# Patient Record
Sex: FEMALE | Race: WHITE | NOT HISPANIC OR LATINO | URBAN - METROPOLITAN AREA
[De-identification: names, ages, dates, MRNs, and addresses within clinical notes are randomized per-mention and may not be internally consistent; named-entity substitution may affect disease eponyms.]

---

## 2024-02-07 ENCOUNTER — OFFICE VISIT (OUTPATIENT)
Dept: OBGYN CLINIC | Facility: CLINIC | Age: 34
End: 2024-02-07
Payer: COMMERCIAL

## 2024-02-07 VITALS
HEART RATE: 65 BPM | BODY MASS INDEX: 20.51 KG/M2 | DIASTOLIC BLOOD PRESSURE: 74 MMHG | WEIGHT: 127.6 LBS | SYSTOLIC BLOOD PRESSURE: 100 MMHG | HEIGHT: 66 IN

## 2024-02-07 DIAGNOSIS — N63.13 MASS OF LOWER OUTER QUADRANT OF RIGHT BREAST: Primary | ICD-10-CM

## 2024-02-07 PROCEDURE — 99203 OFFICE O/P NEW LOW 30 MIN: CPT | Performed by: PHYSICIAN ASSISTANT

## 2024-02-07 NOTE — PROGRESS NOTES
"Patricia Pearson  1990    CC: \"breast mass\" x 1 week    S:  33 y.o. female here with c/o breast mass x 1 week. First noted mass of inferior right breast just prior to menses on sent. In the past week it seems to be less prominent and perhaps decreased in size, but remains present. Has since felt a second mass of the upper outer quadrant but is unsure how long this has been present. No skin changes, nipple changes, discharge, tenderness, or lymphadenopathy/swelling.   Denies family history of breast cancer.       No current outpatient medications on file.  Social History     Socioeconomic History    Marital status: /Civil Union     Spouse name: Not on file    Number of children: Not on file    Years of education: Not on file    Highest education level: Not on file   Occupational History    Not on file   Tobacco Use    Smoking status: Never    Smokeless tobacco: Never   Substance and Sexual Activity    Alcohol use: Never    Drug use: Never    Sexual activity: Yes     Partners: Male     Birth control/protection: None   Other Topics Concern    Not on file   Social History Narrative    Not on file     Social Determinants of Health     Financial Resource Strain: Not on file   Food Insecurity: Not on file   Transportation Needs: Not on file   Physical Activity: Not on file   Stress: Not on file   Social Connections: Not on file   Intimate Partner Violence: Not on file   Housing Stability: Not on file     History reviewed. No pertinent family history.   History reviewed. No pertinent past medical history.       Review of Systems  Constitutional: Negative fever, chills, sweats, recent unexpected change in weight.    Respiratory: Negative cough, shortness of breath, wheezing, or chest wall injury.   Cardiovascular: Negative chest pain or palpitations.   Breast: Positive mass. Negative change in skin coloring, rashes, pain, nipple discharge, or axillary lymph node swelling.     O:  /74 (BP Location: Left arm, " "Patient Position: Sitting, Cuff Size: Standard)   Pulse 65   Ht 5' 5.75\" (1.67 m)   Wt 57.9 kg (127 lb 9.6 oz)   LMP 02/04/2024   BMI 20.75 kg/m²   Constitutional: She appears well and is in no distress  Head: normocephalic, atraumatic.   Breast: Two 1 cm masses of the right breast at 6 and approx 10 oclock. Both are smooth, flat, mobile. NTTP. Breasts are otherwise symmetrical without tenderness, nipple discharge, skin changes or adenopathy.   Respiratory:Normal effort  Neuro:No focal neurological deficits  Pscyh: Normal mood, affect, and behavior     A/P:  Diagnoses and all orders for this visit:    Mass of lower outer quadrant of right breast  -     US breast right limited (diagnostic); Future  -     Mammo diagnostic right w 3d & cad; Future      Reviewed exam findings. Can check imaging. Plan pending results.        "

## 2024-02-15 ENCOUNTER — HOSPITAL ENCOUNTER (EMERGENCY)
Facility: HOSPITAL | Age: 34
Discharge: HOME | End: 2024-02-15
Attending: EMERGENCY MEDICINE
Payer: COMMERCIAL

## 2024-02-15 ENCOUNTER — HOSPITAL ENCOUNTER (OUTPATIENT)
Dept: ULTRASOUND IMAGING | Facility: CLINIC | Age: 34
Discharge: HOME | End: 2024-02-15

## 2024-02-15 ENCOUNTER — APPOINTMENT (EMERGENCY)
Dept: RADIOLOGY | Facility: HOSPITAL | Age: 34
End: 2024-02-15
Payer: COMMERCIAL

## 2024-02-15 ENCOUNTER — HOSPITAL ENCOUNTER (OUTPATIENT)
Dept: MAMMOGRAPHY | Facility: CLINIC | Age: 34
End: 2024-02-15
Payer: COMMERCIAL

## 2024-02-15 VITALS
TEMPERATURE: 97.6 F | DIASTOLIC BLOOD PRESSURE: 71 MMHG | RESPIRATION RATE: 18 BRPM | OXYGEN SATURATION: 100 % | SYSTOLIC BLOOD PRESSURE: 111 MMHG | HEART RATE: 63 BPM

## 2024-02-15 VITALS — SYSTOLIC BLOOD PRESSURE: 100 MMHG | HEART RATE: 63 BPM | DIASTOLIC BLOOD PRESSURE: 68 MMHG

## 2024-02-15 DIAGNOSIS — N63.13 MASS OF LOWER OUTER QUADRANT OF RIGHT BREAST: ICD-10-CM

## 2024-02-15 DIAGNOSIS — R55 VASOVAGAL SYNCOPE: Primary | ICD-10-CM

## 2024-02-15 DIAGNOSIS — N63.13 LUMP IN LOWER OUTER QUADRANT OF RIGHT BREAST: ICD-10-CM

## 2024-02-15 LAB
ALBUMIN SERPL BCP-MCNC: 4.2 G/DL (ref 3.5–5)
ALP SERPL-CCNC: 38 U/L (ref 34–104)
ALT SERPL W P-5'-P-CCNC: 20 U/L (ref 7–52)
ANION GAP SERPL CALCULATED.3IONS-SCNC: 7 MMOL/L
AST SERPL W P-5'-P-CCNC: 21 U/L (ref 13–39)
BASOPHILS # BLD AUTO: 0.03 THOUSANDS/ÂΜL (ref 0–0.1)
BASOPHILS NFR BLD AUTO: 0 % (ref 0–1)
BILIRUB SERPL-MCNC: 0.81 MG/DL (ref 0.2–1)
BUN SERPL-MCNC: 17 MG/DL (ref 5–25)
CALCIUM SERPL-MCNC: 9.4 MG/DL (ref 8.4–10.2)
CARDIAC TROPONIN I PNL SERPL HS: <2 NG/L
CHLORIDE SERPL-SCNC: 104 MMOL/L (ref 96–108)
CO2 SERPL-SCNC: 26 MMOL/L (ref 21–32)
CREAT SERPL-MCNC: 0.75 MG/DL (ref 0.6–1.3)
EOSINOPHIL # BLD AUTO: 0.07 THOUSAND/ÂΜL (ref 0–0.61)
EOSINOPHIL NFR BLD AUTO: 1 % (ref 0–6)
ERYTHROCYTE [DISTWIDTH] IN BLOOD BY AUTOMATED COUNT: 13 % (ref 11.6–15.1)
GFR SERPL CREATININE-BSD FRML MDRD: 103 ML/MIN/1.73SQ M
GLUCOSE SERPL-MCNC: 110 MG/DL (ref 65–140)
GLUCOSE SERPL-MCNC: 111 MG/DL (ref 65–140)
HCG SERPL QL: NEGATIVE
HCT VFR BLD AUTO: 39.2 % (ref 34.8–46.1)
HGB BLD-MCNC: 13.2 G/DL (ref 11.5–15.4)
IMM GRANULOCYTES # BLD AUTO: 0.03 THOUSAND/UL (ref 0–0.2)
IMM GRANULOCYTES NFR BLD AUTO: 0 % (ref 0–2)
LYMPHOCYTES # BLD AUTO: 1.36 THOUSANDS/ÂΜL (ref 0.6–4.47)
LYMPHOCYTES NFR BLD AUTO: 17 % (ref 14–44)
MCH RBC QN AUTO: 29.9 PG (ref 26.8–34.3)
MCHC RBC AUTO-ENTMCNC: 33.7 G/DL (ref 31.4–37.4)
MCV RBC AUTO: 89 FL (ref 82–98)
MONOCYTES # BLD AUTO: 0.63 THOUSAND/ÂΜL (ref 0.17–1.22)
MONOCYTES NFR BLD AUTO: 8 % (ref 4–12)
NEUTROPHILS # BLD AUTO: 6.01 THOUSANDS/ÂΜL (ref 1.85–7.62)
NEUTS SEG NFR BLD AUTO: 74 % (ref 43–75)
NRBC BLD AUTO-RTO: 0 /100 WBCS
PLATELET # BLD AUTO: 196 THOUSANDS/UL (ref 149–390)
PMV BLD AUTO: 9.8 FL (ref 8.9–12.7)
POTASSIUM SERPL-SCNC: 3.8 MMOL/L (ref 3.5–5.3)
PROT SERPL-MCNC: 6.6 G/DL (ref 6.4–8.4)
RBC # BLD AUTO: 4.41 MILLION/UL (ref 3.81–5.12)
SODIUM SERPL-SCNC: 137 MMOL/L (ref 135–147)
WBC # BLD AUTO: 8.13 THOUSAND/UL (ref 4.31–10.16)

## 2024-02-15 PROCEDURE — 77066 DX MAMMO INCL CAD BI: CPT

## 2024-02-15 PROCEDURE — G0279 TOMOSYNTHESIS, MAMMO: HCPCS

## 2024-02-15 PROCEDURE — 99284 EMERGENCY DEPT VISIT MOD MDM: CPT

## 2024-02-15 PROCEDURE — 80053 COMPREHEN METABOLIC PANEL: CPT | Performed by: PHYSICIAN ASSISTANT

## 2024-02-15 PROCEDURE — 71046 X-RAY EXAM CHEST 2 VIEWS: CPT

## 2024-02-15 PROCEDURE — 84484 ASSAY OF TROPONIN QUANT: CPT | Performed by: PHYSICIAN ASSISTANT

## 2024-02-15 PROCEDURE — 99285 EMERGENCY DEPT VISIT HI MDM: CPT | Performed by: PHYSICIAN ASSISTANT

## 2024-02-15 PROCEDURE — 85025 COMPLETE CBC W/AUTO DIFF WBC: CPT | Performed by: PHYSICIAN ASSISTANT

## 2024-02-15 PROCEDURE — 93005 ELECTROCARDIOGRAM TRACING: CPT

## 2024-02-15 PROCEDURE — 84703 CHORIONIC GONADOTROPIN ASSAY: CPT | Performed by: PHYSICIAN ASSISTANT

## 2024-02-15 PROCEDURE — 36415 COLL VENOUS BLD VENIPUNCTURE: CPT | Performed by: PHYSICIAN ASSISTANT

## 2024-02-15 PROCEDURE — 82948 REAGENT STRIP/BLOOD GLUCOSE: CPT

## 2024-02-15 RX ORDER — ONDANSETRON 2 MG/ML
4 INJECTION INTRAMUSCULAR; INTRAVENOUS ONCE
Status: DISCONTINUED | OUTPATIENT
Start: 2024-02-15 | End: 2024-02-15 | Stop reason: HOSPADM

## 2024-02-15 NOTE — ED PROVIDER NOTES
"History  Chief Complaint   Patient presents with    Syncope     Pt coming from [facility], \"I passed out during my mammogram,\" pt was in a seated position and denies head strike, reports feeling light headed at the time and states this has happened before during bloodwork and during a gyno exam, pt denies complaints at this time.     Patricia Pearson is a 33-year-old female presenting to the emergency department after a syncopal episode during an outpatient mammogram this morning. [facility] reports that the patient was placed in a reclined position and juice and crackers however with repeated attempts to have the patient stand upright she became symptomatic.  Patient reports nausea which she is attributing to not having eaten much today.  She feels somewhat dizzy and generally weak.  She denies any chest pain, palpitations, shortness of breath, headache or vision changes.  She does admit to prior episodes of vasovagal syncope with pelvic exams and lab work.  She denies history of congenital heart disease or family history of sudden cardiac death.  Patient offers no other complaints or concerns at this time.        None       No past medical history on file.    No past surgical history on file.    No family history on file.  I have reviewed and agree with the history as documented.    E-Cigarette/Vaping     E-Cigarette/Vaping Substances     Social History     Tobacco Use    Smoking status: Never    Smokeless tobacco: Never   Substance Use Topics    Alcohol use: Never    Drug use: Never       Review of Systems   Constitutional:  Negative for chills, fatigue and fever.   Eyes:  Negative for visual disturbance.   Respiratory:  Negative for shortness of breath.    Cardiovascular:  Negative for chest pain and palpitations.   Gastrointestinal:  Positive for nausea. Negative for abdominal pain and vomiting.   Skin:  Negative for pallor and rash.   Neurological:  Positive for syncope. Negative for weakness, numbness " and headaches.   All other systems reviewed and are negative.      Physical Exam  Physical Exam  Vitals and nursing note reviewed.   Constitutional:       General: She is not in acute distress.     Appearance: Normal appearance. She is well-developed. She is not ill-appearing, toxic-appearing or diaphoretic.   HENT:      Head: Normocephalic and atraumatic.      Right Ear: External ear normal.      Left Ear: External ear normal.      Mouth/Throat:      Mouth: Mucous membranes are moist.   Eyes:      Extraocular Movements: Extraocular movements intact.      Conjunctiva/sclera: Conjunctivae normal.      Pupils: Pupils are equal, round, and reactive to light.   Cardiovascular:      Rate and Rhythm: Normal rate and regular rhythm.      Pulses: Normal pulses.   Pulmonary:      [place]: Pulmonary effort is normal. No respiratory distress.      Breath sounds: Normal breath sounds. No wheezing, rhonchi or rales.   Chest:      Chest wall: No tenderness.   Abdominal:      General: There is no distension.      Palpations: Abdomen is soft.      Tenderness: There is no abdominal tenderness.   Musculoskeletal:         General: Normal range of motion.      Cervical back: Normal range of motion and neck supple.   Skin:     General: Skin is warm and dry.      Capillary Refill: Capillary refill takes less than 2 seconds.   Neurological:      Mental Status: She is alert.      Motor: Motor function is intact. No weakness.      Gait: Gait is intact.   Psychiatric:         Mood and Affect: Mood normal.         Vital Signs  ED Triage Vitals   Temperature Pulse Respirations Blood Pressure SpO2   02/15/24 1157 02/15/24 1157 02/15/24 1157 02/15/24 1200 02/15/24 1157   97.6 °F (36.4 °C) 63 18 111/71 100 %      Temp Source Heart Rate Source Patient Position - Orthostatic VS BP Location FiO2 (%)   02/15/24 1157 02/15/24 1157 -- -- --   Oral Monitor         Pain Score       02/15/24 1157       No Pain           Vitals:    02/15/24 1157 02/15/24  1200   BP:  111/71   Pulse: 63          Visual Acuity      ED Medications  Medications - No data to display      Diagnostic Studies  Results Reviewed       Procedure Component Value Units Date/Time    hCG, qualitative pregnancy [[id number]]  (Normal) Collected: 02/15/24 1247    Lab Status: Final result Specimen: Blood from Arm, Right Updated: 02/15/24 1459     Preg, Serum Negative    HS Troponin 0hr (reflex protocol) [[id number]]  (Normal) Collected: 02/15/24 1247    Lab Status: Final result Specimen: Blood from Arm, Right Updated: 02/15/24 1316     hs TnI 0hr <2 ng/L     Comprehensive metabolic panel [[id number]] Collected: 02/15/24 1247    Lab Status: Final result Specimen: Blood from Arm, Right Updated: 02/15/24 1309     Sodium 137 mmol/L      Potassium 3.8 mmol/L      Chloride 104 mmol/L      CO2 26 mmol/L      ANION GAP 7 mmol/L      BUN 17 mg/dL      Creatinine 0.75 mg/dL      Glucose 110 mg/dL      Calcium 9.4 mg/dL      AST 21 U/L      ALT 20 U/L      Alkaline Phosphatase 38 U/L      Total Protein 6.6 g/dL      Albumin 4.2 g/dL      Total Bilirubin 0.81 mg/dL      eGFR 103 ml/min/1.73sq m     Narrative:      National Kidney Disease Foundation guidelines for Chronic Kidney Disease (CKD):     Stage 1 with normal or high GFR (GFR > 90 mL/min/1.73 square meters)    Stage 2 Mild CKD (GFR = 60-89 mL/min/1.73 square meters)    Stage 3A Moderate CKD (GFR = 45-59 mL/min/1.73 square meters)    Stage 3B Moderate CKD (GFR = 30-44 mL/min/1.73 square meters)    Stage 4 Severe CKD (GFR = 15-29 mL/min/1.73 square meters)    Stage 5 End Stage CKD (GFR <15 mL/min/1.73 square meters)  Note: GFR calculation is accurate only with a steady state creatinine    CBC and differential [[id number]] Collected: 02/15/24 1247    Lab Status: Final result Specimen: Blood from Arm, Right Updated: 02/15/24 1255     WBC 8.13 Thousand/uL      RBC 4.41 Million/uL      Hemoglobin 13.2 g/dL      Hematocrit 39.2 %      MCV 89 fL      MCH 29.9 pg       MCHC 33.7 g/dL      RDW 13.0 %      MPV 9.8 fL      Platelets 196 Thousands/uL      nRBC 0 /100 WBCs      Neutrophils Relative 74 %      Immat GRANS % 0 %      Lymphocytes Relative 17 %      Monocytes Relative 8 %      Eosinophils Relative 1 %      Basophils Relative 0 %      Neutrophils Absolute 6.01 Thousands/µL      Immature Grans Absolute 0.03 Thousand/uL      Lymphocytes Absolute 1.36 Thousands/µL      Monocytes Absolute 0.63 Thousand/µL      Eosinophils Absolute 0.07 Thousand/µL      Basophils Absolute 0.03 Thousands/µL     Fingerstick Glucose (POCT) [[id number]]  (Normal) Collected: 02/15/24 1204    Lab Status: Final result Updated: 02/15/24 1205     POC Glucose 111 mg/dl                    XR chest pa & lateral   Final Result by Pedro Hernandez MD (02/16 0908)      No acute cardiopulmonary disease.            Workstation performed: [id number]                    Procedures  ECG 12 Lead Documentation Only    Date/Time: 2/15/2024 12:23 PM    Performed by: Elise Herrera PA-C  Authorized by: Elise Herrera PA-C    Indications / Diagnosis:  Near syncope  ECG reviewed by me, the ED Provider: yes    Patient location:  ED  Rate:     ECG rate:  58  Rhythm:     Rhythm: sinus bradycardia    Ectopy:     Ectopy: none    QRS:     QRS axis:  Normal    QRS intervals:  Normal  Conduction:     Conduction: normal    Comments:      No ischemic ST/T wave change           ED Course                               SBIRT 20yo+      Flowsheet Row Most Recent Value   Initial Alcohol Screen: US AUDIT-C     1. How often do you have a drink containing alcohol? 0 Filed at: 02/15/2024 1252   2. How many drinks containing alcohol do you have on a typical day you are drinking?  0 Filed at: 02/15/2024 1252   3b. FEMALE Any Age, or MALE 65+: How often do you have 4 or more drinks on one occassion? 0 Filed at: 02/15/2024 1252   Audit-C Score 0 Filed at: 02/15/2024 1252   HEATH: How many times in the past year have you...    Used an  illegal drug or used a prescription medication for non-medical reasons? Never Filed at: 02/15/2024 1252                      Medical Decision Making  This is a 32yo female arriving to the emergency department after a syncopal event while having an outpatient mammogram performed today.  She denies any precipitating or current chest pain, shortness of breath, diaphoresis, headache, abdominal pain or other symptoms. She reports that she has had syncopal episodes with lab work and pelvic exams in the past.    Differential diagnosis includes but is not limited to: suspect vasovagal syncope; will obtain ECG/troponin to assess for arrhythmia, electrolyte derangement, anemia    Initial ED plan: ecg, labs, cxr, ivf/antiemetics and reassessment    Final ED Assessment: Vital signs reviewed on ED presentation, examination as above. All labs and imaging independently reviewed with imaging interpreted by the Radiologist.  ECG without ischemic change, troponin within normal limits.  Remainder of lab work is without significant findings.  Chest x-ray reports no acute cardiopulmonary disease.  Orthostatic vital signs are within normal limits.  Patient has demonstrated ability to tolerate oral intake in the ED without issue.  Her symptoms had resolved during observation.  We reviewed all test results at bedside and plan for discharge with recommendation for rest and continued supportive measures.  Patient declined prescription for Zofran to take as needed for nausea.  Recommended outpatient PCP follow-up as needed, and parameters for ED return discussed at length.  Patient verbalized understanding of plan as above which she is comfortable and agreeable with.  She was discharged in stable condition, ambulating independently from the emergency department today without issue.    Amount and/or Complexity of Data Reviewed  Labs: ordered.  Radiology: ordered.  ECG/medicine tests: ordered.    Risk  OTC drugs.             Disposition  Final  diagnoses:   Vasovagal syncope     Time reflects when diagnosis was documented in both MDM as applicable and the Disposition within this note       Time User Action Codes Description Comment    2/15/2024  1:55 PM Elise Herrera Add [R55] Vasovagal syncope           ED Disposition       ED Disposition   Discharge    Condition   Stable    Date/Time   u Feb 15, 2024 9495    Comment   Patricia Pearson discharge to home/self care.                   Follow-up Information       Follow up With Specialties Details Why Contact Info Additional Information    Your PCP   As needed      [facility] Emergency Department Emergency Medicine  If symptoms worsen [address] [facility] [id number] [phone] [facility] Emergency Department, [address], [place], [address]            There are no discharge medications for this patient.      No discharge procedures on file.    PDMP Review       None            ED Provider  Electronically Signed by             Elise Herrera PA-C  02/16/24 0951

## 2024-02-15 NOTE — PROGRESS NOTES
Called into room by staff, pt found lying on floor of mammogram room with staff members around her, per SUZAN Tellez, mammogram tech that was with patient during procedure, pt was in mammogram machine and stated that she did not feel well, mammogram tech grabbed patient as she lost consciousness and lowered her to the floor, she then pushed button for help, pt was lying supine on floor, pale, diaphoretic, semi-conscious, pt eyes open but not responding to verbal questions, after approx 30 seconds pt able to tell staff her name and where she was, pt giving correct answers, according to staff, when they laid patient on the floor, pt body was rigid and she had thick secretion at mouth, almost seizure-like but pt did not appear to have a post-ictal period, pt able to answer questions quickly when she came around, pt VS assessed many times (see flowsheet), pt c/o nausea but stated she wanted to sit up and assisted to a chair, pt then stated she wanted to lie down for a little so she was rolled in the chair down to our holding area and placed in a reclining chair, pt made comfortable, still c/o nausea, offered crackers and ginger ale but pt declined, ice placed behind pt neck for comfort as she was c/o being hot, after pt spent time lying in chair pt decided she would like to try to complete mammogram imaging, pt walked back to mammogram room for another attempt, pt placed in chair for additional imaging, pt able to complete 1 additional image, when mammogram tech went to change breasts, pt state she didn't feel well again, pt became pale, diaphoretic and stated she was nauseated, pt again rolled back to holding area in chair, placed back in reclining chair and medical emergency called as now pt BP 95/63 with HR of 58, ER staff arrived, report given and care assumed by ER staff

## 2024-02-15 NOTE — DISCHARGE INSTRUCTIONS
Continue hydration and oral intake as tolerated.  Slow position changes.  Return to the ED with any new or worsening symptoms as discussed.

## 2024-02-16 LAB
ATRIAL RATE: 58 BPM
P AXIS: 46 DEGREES
PR INTERVAL: 142 MS
QRS AXIS: 75 DEGREES
QRSD INTERVAL: 84 MS
QT INTERVAL: 430 MS
QTC INTERVAL: 422 MS
T WAVE AXIS: 72 DEGREES
VENTRICULAR RATE: 58 BPM

## 2024-02-16 PROCEDURE — 93010 ELECTROCARDIOGRAM REPORT: CPT | Performed by: INTERNAL MEDICINE

## 2024-02-20 ENCOUNTER — HOSPITAL ENCOUNTER (OUTPATIENT)
Dept: ULTRASOUND IMAGING | Facility: CLINIC | Age: 34
Discharge: HOME | End: 2024-02-20
Payer: COMMERCIAL

## 2024-02-20 PROCEDURE — 76642 ULTRASOUND BREAST LIMITED: CPT

## 2024-04-12 ENCOUNTER — ANNUAL EXAM (OUTPATIENT)
Dept: OBGYN CLINIC | Facility: MEDICAL CENTER | Age: 34
End: 2024-04-12
Payer: COMMERCIAL

## 2024-04-12 ENCOUNTER — LAB (OUTPATIENT)
Dept: LAB | Facility: MEDICAL CENTER | Age: 34
End: 2024-04-12
Payer: COMMERCIAL

## 2024-04-12 ENCOUNTER — TELEPHONE (OUTPATIENT)
Dept: OBGYN CLINIC | Facility: MEDICAL CENTER | Age: 34
End: 2024-04-12

## 2024-04-12 VITALS
WEIGHT: 127.8 LBS | DIASTOLIC BLOOD PRESSURE: 68 MMHG | SYSTOLIC BLOOD PRESSURE: 108 MMHG | HEIGHT: 66 IN | BODY MASS INDEX: 20.54 KG/M2

## 2024-04-12 DIAGNOSIS — Z01.419 ENCOUNTER FOR GYNECOLOGICAL EXAMINATION (GENERAL) (ROUTINE) WITHOUT ABNORMAL FINDINGS: Primary | ICD-10-CM

## 2024-04-12 DIAGNOSIS — N92.6 IRREGULAR MENSTRUATION: ICD-10-CM

## 2024-04-12 DIAGNOSIS — N63.10 MASS OF RIGHT BREAST, UNSPECIFIED QUADRANT: ICD-10-CM

## 2024-04-12 DIAGNOSIS — N76.0 BACTERIAL VAGINOSIS: ICD-10-CM

## 2024-04-12 DIAGNOSIS — Z11.51 SCREENING FOR HPV (HUMAN PAPILLOMAVIRUS): ICD-10-CM

## 2024-04-12 DIAGNOSIS — B96.89 BACTERIAL VAGINOSIS: ICD-10-CM

## 2024-04-12 PROBLEM — N63.20 LUMP OF LEFT BREAST: Status: ACTIVE | Noted: 2024-04-12

## 2024-04-12 PROBLEM — N89.8 VAGINAL IRRITATION: Status: RESOLVED | Noted: 2024-04-12 | Resolved: 2024-04-12

## 2024-04-12 PROBLEM — N89.8 VAGINAL IRRITATION: Status: ACTIVE | Noted: 2024-04-12

## 2024-04-12 LAB
BV WHIFF TEST VAG QL: ABNORMAL
CLUE CELLS SPEC QL WET PREP: ABNORMAL
SL AMB POCT WET MOUNT: ABNORMAL
T VAGINALIS VAG QL WET PREP: ABNORMAL
TSH SERPL DL<=0.05 MIU/L-ACNC: 1.81 UIU/ML (ref 0.45–4.5)
YEAST VAG QL WET PREP: ABNORMAL

## 2024-04-12 PROCEDURE — 36415 COLL VENOUS BLD VENIPUNCTURE: CPT

## 2024-04-12 PROCEDURE — 87210 SMEAR WET MOUNT SALINE/INK: CPT | Performed by: STUDENT IN AN ORGANIZED HEALTH CARE EDUCATION/TRAINING PROGRAM

## 2024-04-12 PROCEDURE — S0612 ANNUAL GYNECOLOGICAL EXAMINA: HCPCS | Performed by: STUDENT IN AN ORGANIZED HEALTH CARE EDUCATION/TRAINING PROGRAM

## 2024-04-12 PROCEDURE — G0145 SCR C/V CYTO,THINLAYER,RESCR: HCPCS | Performed by: STUDENT IN AN ORGANIZED HEALTH CARE EDUCATION/TRAINING PROGRAM

## 2024-04-12 PROCEDURE — 84443 ASSAY THYROID STIM HORMONE: CPT

## 2024-04-12 RX ORDER — METRONIDAZOLE 500 MG/1
500 TABLET ORAL EVERY 12 HOURS SCHEDULED
Qty: 14 TABLET | Refills: 0 | Status: SHIPPED | OUTPATIENT
Start: 2024-04-12 | End: 2024-04-19

## 2024-04-12 NOTE — ASSESSMENT & PLAN NOTE
-pap: collected today, unsure of last pap, denies abnormal   -LMP: 3/31/24  -sexually active, denies dyspareunia, declines contraception  -STD testing: denies  -smoking: has one cigarette every 1-2 days, not interested in quitting    -drinks alcohol socially  -recommend 30 min of exercise daily   -denies family history of ovarian, breast, colon cancer  -return in one year or earlier if needed

## 2024-04-12 NOTE — ASSESSMENT & PLAN NOTE
-wet mount positive for BV   -flagyl ordered to pharmacy, instructions on use provided, side effects discussed

## 2024-04-12 NOTE — TELEPHONE ENCOUNTER
Patient called refill line stating that she was suppose have an antibiotic sent to her pharmacy but they did not receive it. When reviewing notes it looks herbert youngyl was suppose to be sent to the [facility] in [place]. Please review and send to pharmacy. Also please call patient when medication has been sent.

## 2024-04-12 NOTE — ASSESSMENT & PLAN NOTE
-has spotting the week before her menses since stopping breast feeding in Feb 2023   -Pelvic US and TSH ordered, can consider EMB if indicated

## 2024-04-12 NOTE — PROGRESS NOTES
Assessment/Plan:    Encounter for gynecological examination (general) (routine) without abnormal findings  -pap: collected today, unsure of last pap, denies abnormal   -LMP: 3/31/24  -sexually active, denies dyspareunia, declines contraception  -STD testing: denies  -smoking: has one cigarette every 1-2 days, not interested in quitting    -drinks alcohol socially  -recommend 30 min of exercise daily   -denies family history of ovarian, breast, colon cancer  -return in one year or earlier if needed     Lump of right breast  -mammogram: 2024, right breast BIRADs 2, left breast BIRADs 3 with repeat in 6 months scheduled 24    Irregular menstruation  -has spotting the week before her menses since stopping breast feeding in 2023   -Pelvic US and TSH ordered, can consider EMB if indicated    Bacterial vaginosis  -wet mount positive for BV   -flagyl ordered to pharmacy, instructions on use provided, side effects discussed        Diagnoses and all orders for this visit:    Encounter for gynecological examination (general) (routine) without abnormal findings  -     Liquid-based pap, screening    Screening for HPV (human papillomavirus)  -     Liquid-based pap, screening    Mass of right breast, unspecified quadrant    Irregular menstruation  -     US pelvis complete w transvaginal; Future  -     TSH, 3rd generation with Free T4 reflex; Future    Bacterial vaginosis  -     POCT wet mount          Subjective:      Patient ID: Patricia Pearson is a 33 y.o. female.    32yo  LMP 3/31/24 presents for annual exam. Reports spotting the week before her menses. Also reports vaginal irritation and itching for the last two days.        The following portions of the patient's history were reviewed and updated as appropriate: allergies, current medications, past family history, past medical history, past social history, past surgical history, and problem list.    Review of Systems   Constitutional:  Negative for appetite  "change, chills, fatigue and fever.   Respiratory:  Negative for cough, chest tightness, shortness of breath and wheezing.    Cardiovascular:  Negative for chest pain, palpitations and leg swelling.   Gastrointestinal:  Negative for abdominal distention, abdominal pain, constipation, diarrhea, nausea and vomiting.   Endocrine: Negative for cold intolerance, heat intolerance and polyuria.   Genitourinary:  Negative for difficulty urinating, dyspareunia, dysuria, genital sores, menstrual problem, vaginal bleeding, vaginal discharge and vaginal pain.   Neurological:  Negative for dizziness, weakness, light-headedness and headaches.         Objective:      /68 (BP Location: Left arm, Patient Position: Sitting, Cuff Size: Adult)   Ht 5' 5.75\" (1.67 m)   Wt 58 kg (127 lb 12.8 oz)   LMP 03/31/2024 (Exact Date)   BMI 20.78 kg/m²          Physical Exam  Constitutional:       General: She is not in acute distress.     Appearance: Normal appearance. She is normal weight.   Cardiovascular:      Rate and Rhythm: Normal rate.   Pulmonary:      [place]: Pulmonary effort is normal. No respiratory distress.   Chest:      Chest wall: No mass or tenderness.   Breasts:     Breasts are symmetrical.      Right: No swelling, bleeding, inverted nipple, mass, nipple discharge, skin change or tenderness.      Left: Mass present. No swelling, bleeding, inverted nipple, nipple discharge, skin change or tenderness.      Comments: right breast with 1cm mass at 6 oclock, 1 cm mass at 10 oclock, both smooth and mobile, nontender  Abdominal:      General: There is no distension.      Palpations: There is no mass.      Tenderness: There is no abdominal tenderness. There is no guarding or rebound.   Genitourinary:     General: Normal vulva.      Exam position: Lithotomy position.      Pubic Area: No rash.       Labia:         Right: No rash, tenderness, lesion or injury.         Left: No rash, tenderness, lesion or injury.       Urethra: No " prolapse, urethral pain, urethral swelling or urethral lesion.      Vagina: No signs of injury. No vaginal discharge, tenderness, bleeding, lesions or prolapsed vaginal walls.      Cervix: No cervical motion tenderness, discharge, friability, lesion or erythema.      Uterus: Not deviated, not enlarged, not fixed, not tender and no uterine prolapse.       Adnexa:         Right: No mass, tenderness or fullness.          Left: No mass, tenderness or fullness.     Musculoskeletal:      Right lower leg: No edema.      Left lower leg: No edema.   Lymphadenopathy:      Upper Body:      Right upper body: No supraclavicular, axillary or pectoral adenopathy.      Left upper body: No supraclavicular, axillary or pectoral adenopathy.   Neurological:      Mental Status: She is alert and oriented to person, place, and time.   Psychiatric:         Mood and Affect: Mood normal.

## 2024-04-12 NOTE — ASSESSMENT & PLAN NOTE
-mammogram: 2/2024, right breast BIRADs 2, left breast BIRADs 3 with repeat in 6 months scheduled 9/23/24

## 2024-04-15 LAB
HPV HR 12 DNA CVX QL NAA+PROBE: NEGATIVE
HPV16 DNA CVX QL NAA+PROBE: NEGATIVE
HPV18 DNA CVX QL NAA+PROBE: POSITIVE

## 2024-04-16 ENCOUNTER — TELEPHONE (OUTPATIENT)
Age: 34
End: 2024-04-16

## 2024-04-16 NOTE — TELEPHONE ENCOUNTER
Discussed result with patient over the phone, will await cytology results prior to further planning.

## 2024-04-16 NOTE — TELEPHONE ENCOUNTER
Patient is calling in to review pap results. HPV + 18, PAP in process. She is aware that provider has not yet reviewed her results

## 2024-04-20 LAB
LAB AP GYN PRIMARY INTERPRETATION: NORMAL
Lab: NORMAL

## 2024-04-24 ENCOUNTER — HOSPITAL ENCOUNTER (OUTPATIENT)
Dept: ULTRASOUND IMAGING | Facility: HOSPITAL | Age: 34
Discharge: HOME | End: 2024-04-24
Attending: STUDENT IN AN ORGANIZED HEALTH CARE EDUCATION/TRAINING PROGRAM
Payer: COMMERCIAL

## 2024-04-24 DIAGNOSIS — N92.6 IRREGULAR MENSTRUATION: ICD-10-CM

## 2024-04-24 PROCEDURE — 76830 TRANSVAGINAL US NON-OB: CPT

## 2024-04-24 PROCEDURE — 76856 US EXAM PELVIC COMPLETE: CPT

## 2024-04-30 DIAGNOSIS — N63.13 MASS OF LOWER OUTER QUADRANT OF RIGHT BREAST: Primary | ICD-10-CM

## 2024-04-30 DIAGNOSIS — B96.89 BACTERIAL VAGINOSIS: ICD-10-CM

## 2024-04-30 DIAGNOSIS — N76.0 BACTERIAL VAGINOSIS: ICD-10-CM

## 2024-04-30 RX ORDER — METRONIDAZOLE 500 MG/1
500 TABLET ORAL EVERY 12 HOURS SCHEDULED
Qty: 14 TABLET | Refills: 0 | Status: SHIPPED | OUTPATIENT
Start: 2024-04-30 | End: 2024-05-07

## 2024-05-12 PROBLEM — Z01.419 ENCOUNTER FOR GYNECOLOGICAL EXAMINATION (GENERAL) (ROUTINE) WITHOUT ABNORMAL FINDINGS: Status: RESOLVED | Noted: 2024-04-12 | Resolved: 2024-05-12

## 2024-05-14 DIAGNOSIS — N63.13 MASS OF LOWER OUTER QUADRANT OF RIGHT BREAST: Primary | ICD-10-CM

## 2024-05-15 ENCOUNTER — APPOINTMENT (OUTPATIENT)
Dept: LAB | Facility: CLINIC | Age: 34
End: 2024-05-15
Payer: COMMERCIAL

## 2024-05-15 DIAGNOSIS — N63.13 MASS OF LOWER OUTER QUADRANT OF RIGHT BREAST: ICD-10-CM

## 2024-05-15 LAB — PROLACTIN SERPL-MCNC: 10.47 NG/ML (ref 3.34–26.72)

## 2024-05-15 PROCEDURE — 36415 COLL VENOUS BLD VENIPUNCTURE: CPT

## 2024-05-15 PROCEDURE — 84146 ASSAY OF PROLACTIN: CPT

## 2024-06-20 ENCOUNTER — PROCEDURE VISIT (OUTPATIENT)
Age: 34
End: 2024-06-20
Payer: COMMERCIAL

## 2024-06-20 VITALS
DIASTOLIC BLOOD PRESSURE: 70 MMHG | BODY MASS INDEX: 19.8 KG/M2 | SYSTOLIC BLOOD PRESSURE: 104 MMHG | WEIGHT: 123.2 LBS | HEIGHT: 66 IN

## 2024-06-20 DIAGNOSIS — Z32.02 NEGATIVE PREGNANCY TEST: ICD-10-CM

## 2024-06-20 DIAGNOSIS — R87.810 HUMAN PAPILLOMAVIRUS (HPV) TYPE 18 DNA DETECTED IN CERVICAL SPECIMEN: Primary | ICD-10-CM

## 2024-06-20 LAB — SL AMB POCT URINE HCG: NORMAL

## 2024-06-20 PROCEDURE — 88344 IMHCHEM/IMCYTCHM EA MLT ANTB: CPT | Performed by: STUDENT IN AN ORGANIZED HEALTH CARE EDUCATION/TRAINING PROGRAM

## 2024-06-20 PROCEDURE — 57454 BX/CURETT OF CERVIX W/SCOPE: CPT | Performed by: OBSTETRICS & GYNECOLOGY

## 2024-06-20 PROCEDURE — 81025 URINE PREGNANCY TEST: CPT | Performed by: OBSTETRICS & GYNECOLOGY

## 2024-06-20 PROCEDURE — 88305 TISSUE EXAM BY PATHOLOGIST: CPT | Performed by: STUDENT IN AN ORGANIZED HEALTH CARE EDUCATION/TRAINING PROGRAM

## 2024-06-20 NOTE — PROGRESS NOTES
Colposcopy     Date/Time  6/20/2024 11:00 AM     Universal Protocol   Consent: Verbal consent obtained.  Risks and benefits: risks, benefits and alternatives were discussed  Consent given by: patient  Patient understanding: patient states understanding of the procedure being performed  Patient identity confirmed: verbally with patient     Performed by  Anastasiia Richardson DO   Authorized by  Anastasiia Richardson DO     Pre-procedure details      Prepped with: acetic acid     Indication    Indications: HPV 18.   Procedure Details   Procedure: Colposcopy w/ cervical biopsy and ECC      Dysart speculum was placed in the vagina: yes      Under colposcopic examination the transition zone was seen in entirety: yes      Endocervix was curetted using a Kevorkian curette: yes      Cervical biopsy performed with a cervical biopsy punch: yes      Monsel's solution was applied: yes      Biopsy(s): yes      Location:  12 o clock and 7 o clock    Specimen to pathology: yes     Post-procedure      Findings: Mosaicism and White epithelium      Impression: Low grade cervical dysplasia      Patient tolerance of procedure:  Tolerated well, no immediate complications

## 2024-06-26 PROCEDURE — 88344 IMHCHEM/IMCYTCHM EA MLT ANTB: CPT | Performed by: STUDENT IN AN ORGANIZED HEALTH CARE EDUCATION/TRAINING PROGRAM

## 2024-06-26 PROCEDURE — 88305 TISSUE EXAM BY PATHOLOGIST: CPT | Performed by: STUDENT IN AN ORGANIZED HEALTH CARE EDUCATION/TRAINING PROGRAM

## 2024-09-24 ENCOUNTER — HOSPITAL ENCOUNTER (OUTPATIENT)
Dept: ULTRASOUND IMAGING | Facility: CLINIC | Age: 34
Discharge: HOME | End: 2024-09-24
Payer: COMMERCIAL

## 2024-09-24 ENCOUNTER — HOSPITAL ENCOUNTER (OUTPATIENT)
Dept: MAMMOGRAPHY | Facility: CLINIC | Age: 34
Discharge: HOME | End: 2024-09-24
Payer: COMMERCIAL

## 2024-09-24 VITALS — WEIGHT: 123 LBS | HEIGHT: 66 IN | BODY MASS INDEX: 19.77 KG/M2

## 2024-09-24 DIAGNOSIS — N63.13 MASS OF LOWER OUTER QUADRANT OF RIGHT BREAST: ICD-10-CM

## 2024-09-24 DIAGNOSIS — R92.8 ABNORMAL MAMMOGRAM: ICD-10-CM

## 2024-09-24 DIAGNOSIS — N63.13 LUMP IN LOWER OUTER QUADRANT OF RIGHT BREAST: ICD-10-CM

## 2024-09-24 PROCEDURE — G0279 TOMOSYNTHESIS, MAMMO: HCPCS

## 2024-09-24 PROCEDURE — 76642 ULTRASOUND BREAST LIMITED: CPT

## 2024-09-24 PROCEDURE — 77066 DX MAMMO INCL CAD BI: CPT

## 2024-09-26 NOTE — RESULT ENCOUNTER NOTE
Stable/benign changes on mammogram   F/u in 6 months    Message sent via [employer] · Assessment	  71y  F with PMH of  HTN, DM (recently started on insulin), CKD, HLD brought by family to the ED with c/o AMS for 1 day. Per daughter at bedside, patient lives with her granddaughters and is A&Ox3, fully functional at baseline, patient had been c/o right sided headache for the past week, yesterday the granddaughter noticed that the patient was not acting like herself, more tired, sleeping all day, and her speech was not making sense.   Per family the patient didn't complain of any fever, nausea, vomiting, abdominal pain, diarrhea, leg pain, swelling, cough, shortness of breath, chest pain, incontinence. No recent travel, rashes.     IMPRESSION;  Metabolic encephalopathy  No bacterial / viral meningitis/encephalitis  5/9 CSF viral PCR NG  MRI/MRV NG  EEG diffuse cerebral dysfunction  No pyuria. Contaminated with E coli > is not the cause of he issues/fevers  No PNA  COVID-19 /RVP NG  5/5 BCx NG    RECOMMENDATIONS;  D/c Acyclovir

## 2025-03-18 ENCOUNTER — OFFICE VISIT (OUTPATIENT)
Dept: OBGYN CLINIC | Facility: CLINIC | Age: 35
End: 2025-03-18
Payer: COMMERCIAL

## 2025-03-18 VITALS — SYSTOLIC BLOOD PRESSURE: 102 MMHG | WEIGHT: 126.4 LBS | DIASTOLIC BLOOD PRESSURE: 60 MMHG | BODY MASS INDEX: 20.56 KG/M2

## 2025-03-18 DIAGNOSIS — N89.8 VAGINA ITCHING: ICD-10-CM

## 2025-03-18 DIAGNOSIS — B37.31 YEAST VAGINITIS: Primary | ICD-10-CM

## 2025-03-18 LAB
BV WHIFF TEST VAG QL: ABNORMAL
CLUE CELLS SPEC QL WET PREP: ABNORMAL
SL AMB POCT WET MOUNT: ABNORMAL
T VAGINALIS VAG QL WET PREP: ABNORMAL
YEAST VAG QL WET PREP: ABNORMAL

## 2025-03-18 PROCEDURE — 87210 SMEAR WET MOUNT SALINE/INK: CPT | Performed by: STUDENT IN AN ORGANIZED HEALTH CARE EDUCATION/TRAINING PROGRAM

## 2025-03-18 PROCEDURE — 99214 OFFICE O/P EST MOD 30 MIN: CPT | Performed by: STUDENT IN AN ORGANIZED HEALTH CARE EDUCATION/TRAINING PROGRAM

## 2025-03-18 RX ORDER — FLUCONAZOLE 150 MG/1
150 TABLET ORAL WEEKLY
Qty: 2 TABLET | Refills: 0 | Status: SHIPPED | OUTPATIENT
Start: 2025-03-18 | End: 2025-03-26

## 2025-03-18 NOTE — PROGRESS NOTES
Name: Patricia Pearson      : 1990      MRN: [id number]  Encounter Provider: Senait Darling DO  Encounter Date: 3/18/2025   Encounter department: [facility] [place]  :  Assessment & Plan  Yeast vaginitis  -wet mount positive for yeast vaginitis.   -diflucan sent to pharmacy. Instructions on use provided. Side effects discussed   -follow up prn/annual     Orders:    POCT wet mount    fluconazole (DIFLUCAN) 150 mg tablet; Take 1 tablet (150 mg total) by mouth once a week for 2 doses    Vagina itching             History of Present Illness   HPI  Patricia Pearson is a 34 y.o. female  LMP 3/14/25 presents for vaginal itching x3 days. Reports just finished amoxicillin for sinus infection. Denies abnormal discharge or odor. Denies pelvic pain.       Review of Systems   Constitutional:  Negative for appetite change, chills, fatigue and fever.   Respiratory:  Negative for cough, chest tightness, shortness of breath and wheezing.    Cardiovascular:  Negative for chest pain, palpitations and leg swelling.   Gastrointestinal:  Negative for abdominal distention, abdominal pain, constipation, diarrhea, nausea and vomiting.   Endocrine: Negative for cold intolerance, heat intolerance and polyuria.   Genitourinary:  Negative for difficulty urinating, dyspareunia, dysuria, genital sores, menstrual problem, vaginal bleeding, vaginal discharge and vaginal pain.   Neurological:  Negative for dizziness, weakness, light-headedness and headaches.          Objective   /60   Wt 57.3 kg (126 lb 6.4 oz)   LMP 2025 (Exact Date)   BMI 20.56 kg/m²      Physical Exam  Constitutional:       General: She is not in acute distress.     Appearance: Normal appearance. She is not ill-appearing.   Cardiovascular:      Rate and Rhythm: Normal rate.   Pulmonary:      Effort: Pulmonary effort is normal. No respiratory distress.   Abdominal:      General: Abdomen is flat. There is no  distension.      Palpations: Abdomen is soft.      Tenderness: There is no abdominal tenderness. There is no guarding or rebound.   Genitourinary:     General: Normal vulva.      Exam position: Lithotomy position.      Labia:         Right: No rash, tenderness, lesion or injury.         Left: No rash, tenderness, lesion or injury.       Vagina: Normal. No foreign body. No vaginal discharge, erythema, tenderness, bleeding or lesions.      Cervix: Normal. No cervical motion tenderness, discharge, friability, lesion, erythema, cervical bleeding or eversion.      Uterus: Normal. Not enlarged, not fixed, not tender and no uterine prolapse.       Adnexa: Right adnexa normal and left adnexa normal.        Right: No mass, tenderness or fullness.          Left: No mass, tenderness or fullness.     Musculoskeletal:      Right lower leg: No edema.      Left lower leg: No edema.   Neurological:      General: No focal deficit present.      Mental Status: She is alert and oriented to person, place, and time.   Psychiatric:         Mood and Affect: Mood normal.         Behavior: Behavior normal.         Thought Content: Thought content normal.         Judgment: Judgment normal.

## 2025-03-18 NOTE — ASSESSMENT & PLAN NOTE
-wet mount positive for yeast vaginitis.   -diflucan sent to pharmacy. Instructions on use provided. Side effects discussed   -follow up prn/annual     Orders:    POCT wet mount    fluconazole (DIFLUCAN) 150 mg tablet; Take 1 tablet (150 mg total) by mouth once a week for 2 doses

## 2025-04-11 ENCOUNTER — NURSE TRIAGE (OUTPATIENT)
Age: 35
End: 2025-04-11

## 2025-04-11 DIAGNOSIS — N89.8 VAGINA ITCHING: Primary | ICD-10-CM

## 2025-04-11 RX ORDER — FLUCONAZOLE 150 MG/1
150 TABLET ORAL WEEKLY
Qty: 2 TABLET | Refills: 0 | Status: SHIPPED | OUTPATIENT
Start: 2025-04-11 | End: 2025-04-19

## 2025-04-11 NOTE — TELEPHONE ENCOUNTER
"FOLLOW UP: n/a    REASON FOR CONVERSATION: Vaginitis    SYMPTOMS: White Vaginal discharge, itching and irritation. Denies odor or pain    OTHER: Patient treated for yeast 3/18 with 2 diflucan. Symptoms had completely resolved, but came back after period. Patient requesting treatment. Routing to provider for 2 doses    DISPOSITION: Home Care  Reason for Disposition   Normal vaginal discharge    Answer Assessment - Initial Assessment Questions  1. DISCHARGE: \"Describe the discharge.\" (e.g., white, yellow, green, gray, foamy, cottage cheese-like)      White discharge, itching, and irritation  2. ODOR: \"Is there a bad odor?\"      denies  3. ONSET: \"When did the discharge begin?\"      A few days ago  4. RASH: \"Is there a rash in the genital area?\" If Yes, ask: \"Describe it.\" (e.g., redness, blisters, sores, bumps)      denies  5. ABDOMEN PAIN: \"Are you having any abdomen pain?\" If Yes, ask: \"What does it feel like? \" (e.g., crampy, dull, intermittent, constant)       denies  6. ABDOMEN PAIN SEVERITY: If present, ask: \"How bad is it?\" (e.g., Scale 1-10; mild, moderate, or severe)      N/a  7. CAUSE: \"What do you think is causing the discharge?\" \"Have you had the same problem before?\" \"What happened then?\"      Yeast infection--recently treated 3/18-resolved, but symptoms returned after period  8. OTHER SYMPTOMS: \"Do you have any other symptoms?\" (e.g., fever, itching, vaginal bleeding, pain with urination, injury to genital area, vaginal foreign body)      denies  9. PREGNANCY: \"Is there any chance you are pregnant?\" \"When was your last menstrual period?\"      denies    Protocols used: Vaginal Discharge-Adult-[place]    "

## 2025-05-07 ENCOUNTER — ANNUAL EXAM (OUTPATIENT)
Dept: OBGYN CLINIC | Facility: CLINIC | Age: 35
End: 2025-05-07
Payer: COMMERCIAL

## 2025-05-07 VITALS
DIASTOLIC BLOOD PRESSURE: 78 MMHG | SYSTOLIC BLOOD PRESSURE: 110 MMHG | HEIGHT: 66 IN | WEIGHT: 122.8 LBS | BODY MASS INDEX: 19.73 KG/M2

## 2025-05-07 DIAGNOSIS — R87.810 HUMAN PAPILLOMAVIRUS (HPV) TYPE 18 DNA DETECTED IN CERVICAL SPECIMEN: ICD-10-CM

## 2025-05-07 DIAGNOSIS — Z11.3 SCREEN FOR STD (SEXUALLY TRANSMITTED DISEASE): ICD-10-CM

## 2025-05-07 DIAGNOSIS — Z01.419 ENCNTR FOR GYN EXAM (GENERAL) (ROUTINE) W/O ABN FINDINGS: Primary | ICD-10-CM

## 2025-05-07 DIAGNOSIS — N63.25 MASS OVERLAPPING MULTIPLE QUADRANTS OF LEFT BREAST: ICD-10-CM

## 2025-05-07 PROBLEM — B37.31 YEAST VAGINITIS: Status: RESOLVED | Noted: 2025-03-18 | Resolved: 2025-05-07

## 2025-05-07 PROBLEM — N63.10 LUMP OF RIGHT BREAST: Status: RESOLVED | Noted: 2024-04-12 | Resolved: 2025-05-07

## 2025-05-07 PROBLEM — N76.0 BACTERIAL VAGINOSIS: Status: RESOLVED | Noted: 2024-04-12 | Resolved: 2025-05-07

## 2025-05-07 PROBLEM — N92.6 IRREGULAR MENSTRUATION: Status: RESOLVED | Noted: 2024-04-12 | Resolved: 2025-05-07

## 2025-05-07 PROBLEM — B96.89 BACTERIAL VAGINOSIS: Status: RESOLVED | Noted: 2024-04-12 | Resolved: 2025-05-07

## 2025-05-07 PROCEDURE — 87491 CHLMYD TRACH DNA AMP PROBE: CPT | Performed by: STUDENT IN AN ORGANIZED HEALTH CARE EDUCATION/TRAINING PROGRAM

## 2025-05-07 PROCEDURE — S0612 ANNUAL GYNECOLOGICAL EXAMINA: HCPCS | Performed by: STUDENT IN AN ORGANIZED HEALTH CARE EDUCATION/TRAINING PROGRAM

## 2025-05-07 PROCEDURE — G0145 SCR C/V CYTO,THINLAYER,RESCR: HCPCS | Performed by: PATHOLOGY

## 2025-05-07 PROCEDURE — 87591 N.GONORRHOEAE DNA AMP PROB: CPT | Performed by: STUDENT IN AN ORGANIZED HEALTH CARE EDUCATION/TRAINING PROGRAM

## 2025-05-07 PROCEDURE — G0476 HPV COMBO ASSAY CA SCREEN: HCPCS | Performed by: STUDENT IN AN ORGANIZED HEALTH CARE EDUCATION/TRAINING PROGRAM

## 2025-05-07 NOTE — PROGRESS NOTES
"Name: Patricia Pearson      : 1990      MRN: [id number]  Encounter Provider: Senait Darling DO  Encounter Date: 2025   Encounter department: [facility] [place]  :  Assessment & Plan  Encntr for gyn exam (general) (routine) w/o abn findings  -LMP: 25  -regular monthly menstrual cycles, denies dysmenorrhea or menorrhagia   -sexually active, denies dyspareunia, does not want contraception  -smoking: denies  -drinks alcohol socially  -recommend 30 min of exercise daily   -denies family history of ovarian, breast, colon cancer  -return in one year or earlier if needed     Orders:    Liquid-based pap, screening    Screen for STD (sexually transmitted disease)  -GCCT collected today    Orders:    Chlamydia/GC amplified DNA by PCR    Human papillomavirus (HPV) type 18 DNA detected in cervical specimen  -25 pap NILM HPV 18 positive   -24 colpo NIKOLE 1   -repeat pap collected today         Mass overlapping multiple quadrants of left breast  -24 BIRADs 3   -repeat US scheduled 25             History of Present Illness   HPI  Patricia Pearson is a 34 y.o. female  LMP 25 presents for annual exam.       Review of Systems   Constitutional:  Negative for appetite change, chills, fatigue and fever.   Respiratory:  Negative for cough, chest tightness, shortness of breath and wheezing.    Cardiovascular:  Negative for chest pain, palpitations and leg swelling.   Gastrointestinal:  Negative for abdominal distention, abdominal pain, constipation, diarrhea, nausea and vomiting.   Endocrine: Negative for cold intolerance, heat intolerance and polyuria.   Genitourinary:  Negative for difficulty urinating, dyspareunia, dysuria, genital sores, menstrual problem, vaginal bleeding, vaginal discharge and vaginal pain.   Neurological:  Negative for dizziness, weakness, light-headedness and headaches.          Objective   /78   Ht 5' 5.75\" (1.67 m)   Wt 55.7 " kg (122 lb 12.8 oz)   LMP 04/28/2025   BMI 19.97 kg/m²      Physical Exam  Constitutional:       General: She is not in acute distress.     Appearance: Normal appearance. She is not ill-appearing.   Cardiovascular:      Rate and Rhythm: Normal rate.   Pulmonary:      Effort: Pulmonary effort is normal. No respiratory distress.   Chest:   Breasts:     Breasts are symmetrical.      Right: Normal. No swelling, bleeding, inverted nipple, mass, nipple discharge, skin change or tenderness.      Left: Normal. No swelling, bleeding, inverted nipple, mass, nipple discharge, skin change or tenderness.   Abdominal:      General: Abdomen is flat. There is no distension.      Palpations: Abdomen is soft.      Tenderness: There is no abdominal tenderness. There is no guarding or rebound.   Genitourinary:     General: Normal vulva.      Exam position: Lithotomy position.      Labia:         Right: No rash, tenderness, lesion or injury.         Left: No rash, tenderness, lesion or injury.       Urethra: No prolapse, urethral pain, urethral swelling or urethral lesion.      Vagina: Normal. No foreign body. No vaginal discharge, erythema, tenderness, bleeding or lesions.      Cervix: Normal. No cervical motion tenderness, discharge, friability, lesion, erythema, cervical bleeding or eversion.      Uterus: Normal. Not enlarged, not fixed, not tender and no uterine prolapse.       Adnexa: Right adnexa normal and left adnexa normal.        Right: No mass, tenderness or fullness.          Left: No mass, tenderness or fullness.     Musculoskeletal:      Right lower leg: No edema.      Left lower leg: No edema.   Lymphadenopathy:      Upper Body:      Right upper body: No supraclavicular, axillary or pectoral adenopathy.      Left upper body: No supraclavicular, axillary or pectoral adenopathy.   Neurological:      General: No focal deficit present.      Mental Status: She is alert and oriented to person, place, and time.   Psychiatric:          Mood and Affect: Mood normal.         Behavior: Behavior normal.         Thought Content: Thought content normal.         Judgment: Judgment normal.

## 2025-05-08 LAB
HPV HR 12 DNA CVX QL NAA+PROBE: POSITIVE
HPV16 DNA CVX QL NAA+PROBE: NEGATIVE
HPV18 DNA CVX QL NAA+PROBE: POSITIVE

## 2025-05-09 ENCOUNTER — RESULTS FOLLOW-UP (OUTPATIENT)
Dept: OBGYN CLINIC | Facility: CLINIC | Age: 35
End: 2025-05-09

## 2025-05-09 LAB
C TRACH DNA SPEC QL NAA+PROBE: NEGATIVE
N GONORRHOEA DNA SPEC QL NAA+PROBE: NEGATIVE

## 2025-05-09 NOTE — TELEPHONE ENCOUNTER
Results and recommendations reviewed and patient verbalized understanding.     Patient will be in [place] 7/15-September 2025. First available appt with Dr. Darling 7/22. Routing to clerical team for assistance with scheduling.     [facility] patient instructions reviewed with patient prior to attempted transfer and patient verbalized understanding.

## 2025-05-13 ENCOUNTER — NURSE TRIAGE (OUTPATIENT)
Age: 35
End: 2025-05-13

## 2025-05-13 LAB
LAB AP GYN PRIMARY INTERPRETATION: ABNORMAL
Lab: ABNORMAL
PATH INTERP SPEC-IMP: ABNORMAL

## 2025-05-13 PROCEDURE — G0124 SCREEN C/V THIN LAYER BY MD: HCPCS | Performed by: PATHOLOGY

## 2025-05-13 NOTE — TELEPHONE ENCOUNTER
"FOLLOW UP: Review with physician.     REASON FOR CONVERSATION: Vaginal Itching    SYMPTOMS: mild vaginal itching    OTHER: Pt reporting concerns for a recurrent yeast infection. States she was treated with diflucan in both March and April. She started with mild vaginal itching yesterday which is how it started the previous two months as well. Denies any abnormal discharge, odor, rash, pain, bleeding. She tried using monistat last night but it burned so she wiped it off. Advised pt that given recurrent infections, she should be seen in office. Pt states she was seen for this not to long ago and also just had her annual exam, wondering if a script could be sent to pharmacy in chart. Advised will review.     Pt also states that she has an appointment for HPV vaccine tomorrow. Wondering if she should still have this done since she is positive for HPV. Advised she can still get the vaccine to prevent against other strains. She verbalized understanding.     DISPOSITION: Home Care    Reason for Disposition   Symptoms of a yeast infection (i.e., itchy, white discharge, not bad smelling) and feels like prior vaginal yeast infections    Answer Assessment - Initial Assessment Questions  1. SYMPTOM: \"What's the main symptom you're concerned about?\" (e.g., pain, itching, dryness)      Vaginal itching  2. LOCATION: \"Where is the  symptoms located?\" (e.g., inside/outside, left/right)      vagina  3. ONSET: \"When did the  symptom  start?\"      yesterday  4. PAIN: \"Is there any pain?\" If Yes, ask: \"How bad is it?\" (Scale: 1-10; mild, moderate, severe)      denies  5. ITCHING: \"Is there any itching?\" If Yes, ask: \"How bad is it?\" (Scale: 1-10; mild, moderate, severe)      Mild itching  6. CAUSE: \"What do you think is causing the discharge?\" \"Have you had the same problem before?\" \"What happened then?\"      Denies discharge, believes this is from yeast  7. OTHER SYMPTOMS: \"Do you have any other symptoms?\" (e.g., fever, itching, " "vaginal bleeding, pain with urination, injury to genital area, vaginal foreign body)      denies  8. PREGNANCY: \"Is there any chance you are pregnant?\" \"When was your last menstrual period?\"      4/28/25; denies chance of pregnancy    Protocols used: Vaginal Symptoms-Adult-[place]    "

## 2025-05-14 NOTE — TELEPHONE ENCOUNTER
Offered AM appt in  tomorrow - declined. Patient scheduled for [facility] at 11:15 in [place] with Dr Clayton Castillo

## 2025-05-16 ENCOUNTER — OFFICE VISIT (OUTPATIENT)
Dept: OBGYN CLINIC | Facility: CLINIC | Age: 35
End: 2025-05-16
Payer: COMMERCIAL

## 2025-05-16 VITALS — DIASTOLIC BLOOD PRESSURE: 66 MMHG | SYSTOLIC BLOOD PRESSURE: 104 MMHG

## 2025-05-16 DIAGNOSIS — N93.9 VAGINAL SPOTTING: ICD-10-CM

## 2025-05-16 DIAGNOSIS — Z23 NEED FOR HPV VACCINATION: ICD-10-CM

## 2025-05-16 DIAGNOSIS — N89.8 VAGINAL ITCHING: Primary | ICD-10-CM

## 2025-05-16 DIAGNOSIS — N76.0 RECURRENT VAGINITIS: ICD-10-CM

## 2025-05-16 DIAGNOSIS — B37.9 YEAST INFECTION: ICD-10-CM

## 2025-05-16 PROCEDURE — 90651 9VHPV VACCINE 2/3 DOSE IM: CPT | Performed by: OBSTETRICS & GYNECOLOGY

## 2025-05-16 PROCEDURE — 87480 CANDIDA DNA DIR PROBE: CPT | Performed by: OBSTETRICS & GYNECOLOGY

## 2025-05-16 PROCEDURE — 87510 GARDNER VAG DNA DIR PROBE: CPT | Performed by: OBSTETRICS & GYNECOLOGY

## 2025-05-16 PROCEDURE — 99214 OFFICE O/P EST MOD 30 MIN: CPT | Performed by: OBSTETRICS & GYNECOLOGY

## 2025-05-16 PROCEDURE — 87660 TRICHOMONAS VAGIN DIR PROBE: CPT | Performed by: OBSTETRICS & GYNECOLOGY

## 2025-05-16 PROCEDURE — 90471 IMMUNIZATION ADMIN: CPT | Performed by: OBSTETRICS & GYNECOLOGY

## 2025-05-16 RX ORDER — TERCONAZOLE 4 MG/G
1 CREAM VAGINAL
Qty: 45 G | Refills: 0 | Status: SHIPPED | OUTPATIENT
Start: 2025-05-16 | End: 2025-05-21

## 2025-05-16 NOTE — PROGRESS NOTES
Name: Patricia Pearson      : 1990      MRN: [id number]  Encounter Provider: Ilana Bowser MD  Encounter Date: 2025   Encounter department: [facility] [place]  :  Assessment & Plan  Vaginal itching  Oh wet mount/KOH - yeast noted. Discharge with the appearnace of yeast. White, clumpy.   Will treat with terconazole and await Affirm results.     Discussed hormonal changes, changes in normal vin as possible causes.   Symptoms worse the week after menses, after IC.   Can try vaginal probiotics, boric acid after menses.  Vaginal lubricant with sex may be helpful as well.     Also, continue healthy diet, hydration, maintain healthy body weight  Orders:    POCT wet mount    VAGINOSIS DNA PROBE    Need for HPV vaccination    Orders:    HPV Vaccine 9 valent IM    Yeast infection    Orders:    terconazole (TERAZOL 7) 0.4 % vaginal cream; Insert 1 applicator into the vagina daily at bedtime for 5 days    Vaginal spotting  Notes spotting pre and post menses for some time. Not mid cycle.   Orders:    US pelvis complete w transvaginal; Future    Recurrent vaginitis    Orders:    US pelvis complete w transvaginal; Future        History of Present Illness   Symptom:recurrent vaginitis  Location:vagina  Quality:burning, itching  Radiation:no  Severity:moderate  Onset: 3 months ago  Duration:1 week   Timin week after menses each month x 3 months  Progression:slowly worsening   Chronicity:acute  Relieved:use of diflucan will relieve symptoms until after next period  Exacerbated:sex, after menses  Treatments tried/result:diflucan  Associated symptoms:discomfort with sex, HPV infection - due for second colposcopy soon.  Vaginal spotting 1 week before menses          Patricia Pearson is a 34 y.o. female who presents for vaginal itching. Treated with Diflucan twice in the past 2 months. Course of treatment works for a few weeks and then symptoms will start to come back.     Seen  for annual 5/7/25 with Dr. Darling.  Would like Gardasil #1 today. Given in LD. Injection  was painful to patient and started to feel light headed. Instructed to lay down until feeling better.         Review of Systems   Constitutional:  Negative for activity change, appetite change, chills, fatigue and fever.   HENT:  Negative for rhinorrhea, sneezing and sore throat.    Eyes:  Negative for visual disturbance.   Respiratory:  Negative for cough, shortness of breath and wheezing.    Cardiovascular:  Negative for chest pain, palpitations and leg swelling.   Gastrointestinal:  Negative for abdominal distention, constipation, diarrhea, nausea and vomiting.   Genitourinary:  Positive for menstrual problem, vaginal bleeding, vaginal discharge and vaginal pain. Negative for decreased urine volume, difficulty urinating, dyspareunia, pelvic pain and urgency.   Neurological:  Negative for syncope and light-headedness.          Objective   /66 (BP Location: Left arm, Patient Position: Sitting, Cuff Size: Standard)   LMP 04/28/2025      Physical Exam  Genitourinary:     Labia:         Right: No rash, tenderness or lesion.         Left: No rash, tenderness or lesion.       Vagina: Normal. No vaginal discharge, erythema, tenderness or bleeding.      Cervix: No cervical motion tenderness, discharge, friability or erythema.      Uterus: Not deviated, not enlarged, not fixed and not tender.       Adnexa:         Right: No mass, tenderness or fullness.          Left: No mass, tenderness or fullness.        Comments: Discharge: white, clumpy    No erythema. No edema.   pH 4.2  KOH ; hyphae   Wet: no clue cells, no trich, no wbc

## 2025-05-17 LAB
CANDIDA RRNA VAG QL PROBE: DETECTED
G VAGINALIS RRNA GENITAL QL PROBE: NOT DETECTED
T VAGINALIS RRNA GENITAL QL PROBE: NOT DETECTED

## 2025-05-19 ENCOUNTER — RESULTS FOLLOW-UP (OUTPATIENT)
Dept: OBGYN CLINIC | Facility: CLINIC | Age: 35
End: 2025-05-19

## 2025-06-01 ENCOUNTER — HOSPITAL ENCOUNTER (OUTPATIENT)
Dept: ULTRASOUND IMAGING | Facility: HOSPITAL | Age: 35
Discharge: HOME | End: 2025-06-01
Attending: OBSTETRICS & GYNECOLOGY
Payer: COMMERCIAL

## 2025-06-01 DIAGNOSIS — N76.0 RECURRENT VAGINITIS: ICD-10-CM

## 2025-06-01 DIAGNOSIS — N93.9 VAGINAL SPOTTING: ICD-10-CM

## 2025-06-01 PROCEDURE — 76830 TRANSVAGINAL US NON-OB: CPT

## 2025-06-01 PROCEDURE — 76856 US EXAM PELVIC COMPLETE: CPT

## 2025-06-06 PROBLEM — Z11.3 SCREEN FOR STD (SEXUALLY TRANSMITTED DISEASE): Status: RESOLVED | Noted: 2025-05-07 | Resolved: 2025-06-06

## 2025-06-06 PROBLEM — Z01.419 ENCNTR FOR GYN EXAM (GENERAL) (ROUTINE) W/O ABN FINDINGS: Status: RESOLVED | Noted: 2025-05-07 | Resolved: 2025-06-06

## 2025-06-13 ENCOUNTER — HOSPITAL ENCOUNTER (OUTPATIENT)
Dept: MAMMOGRAPHY | Facility: CLINIC | Age: 35
End: 2025-06-13
Payer: COMMERCIAL

## 2025-06-13 VITALS — SYSTOLIC BLOOD PRESSURE: 115 MMHG | DIASTOLIC BLOOD PRESSURE: 78 MMHG | HEART RATE: 62 BPM

## 2025-06-13 DIAGNOSIS — R92.8 MAMMOGRAM ABNORMAL: ICD-10-CM

## 2025-06-13 PROCEDURE — G0279 TOMOSYNTHESIS, MAMMO: HCPCS

## 2025-06-13 PROCEDURE — 77066 DX MAMMO INCL CAD BI: CPT

## 2025-06-13 NOTE — PROGRESS NOTES
Pt in center for diagnostic mammogram follow up, during last mammogram picture pt became pale/diaphoretic and had vasovagal episode, pt caught by mammogram tech prior to falling, upon my arrival in the room, pt lying back in a chair, VS taken (see flowsheet), pt skin pale, pt states she always passes out during procedures (pt did same for mammogram imaging last year), pt given apple juice, pt blood pressure still low on second reading, pt placed into a rolling chair and moved to holding bay, pt states she is starting to feel better, VS taken again and returned to normal parameters, pt still needed additional imaging, pt walked back to mammogram room, placed in a chair and final pictures take with patient seated, pt felt ok after imaging and walked back to changing room without incident.

## 2025-06-14 ENCOUNTER — RESULTS FOLLOW-UP (OUTPATIENT)
Dept: OBGYN CLINIC | Facility: CLINIC | Age: 35
End: 2025-06-14

## 2025-06-14 DIAGNOSIS — B37.9 YEAST INFECTION: Primary | ICD-10-CM

## 2025-06-15 ENCOUNTER — RESULTS FOLLOW-UP (OUTPATIENT)
Dept: LABOR AND DELIVERY | Facility: HOSPITAL | Age: 35
End: 2025-06-15

## 2025-06-17 RX ORDER — TERCONAZOLE 4 MG/G
1 CREAM VAGINAL
Qty: 45 G | Refills: 0 | Status: SHIPPED | OUTPATIENT
Start: 2025-06-17 | End: 2025-06-19 | Stop reason: ALTCHOICE

## 2025-06-19 ENCOUNTER — PROCEDURE VISIT (OUTPATIENT)
Dept: OBGYN CLINIC | Facility: CLINIC | Age: 35
End: 2025-06-19
Payer: COMMERCIAL

## 2025-06-19 VITALS — DIASTOLIC BLOOD PRESSURE: 72 MMHG | WEIGHT: 121.6 LBS | SYSTOLIC BLOOD PRESSURE: 110 MMHG | BODY MASS INDEX: 19.78 KG/M2

## 2025-06-19 DIAGNOSIS — B97.7 HPV (HUMAN PAPILLOMA VIRUS) INFECTION: Primary | ICD-10-CM

## 2025-06-19 DIAGNOSIS — N76.0 RECURRENT VAGINITIS: ICD-10-CM

## 2025-06-19 LAB — SL AMB POCT URINE HCG: NORMAL

## 2025-06-19 PROCEDURE — 99213 OFFICE O/P EST LOW 20 MIN: CPT | Performed by: STUDENT IN AN ORGANIZED HEALTH CARE EDUCATION/TRAINING PROGRAM

## 2025-06-19 PROCEDURE — 81025 URINE PREGNANCY TEST: CPT | Performed by: STUDENT IN AN ORGANIZED HEALTH CARE EDUCATION/TRAINING PROGRAM

## 2025-06-19 PROCEDURE — 88305 TISSUE EXAM BY PATHOLOGIST: CPT | Performed by: PATHOLOGY

## 2025-06-19 PROCEDURE — 57454 BX/CURETT OF CERVIX W/SCOPE: CPT | Performed by: STUDENT IN AN ORGANIZED HEALTH CARE EDUCATION/TRAINING PROGRAM

## 2025-06-19 RX ORDER — FLUCONAZOLE 150 MG/1
TABLET ORAL
Qty: 27 TABLET | Refills: 0 | Status: SHIPPED | OUTPATIENT
Start: 2025-06-19 | End: 2025-12-19

## 2025-06-19 NOTE — PROGRESS NOTES
Colposcopy    Date/Time: 6/19/2025 11:00 AM    Performed by: Senait Darling DO  Authorized by: Senait Darling DO    Other Assisting Provider: No    Verbal consent obtained?: Yes    Risks and benefits: Risks, benefits and alternatives were discussed    Consent given by:  Patient  Patient states understanding of procedure being performed: Yes    Relevant documents present and verified: Yes    Test results available and properly labeled: Yes    Required items: Required blood products, implants, devices and special equipment available    Patient identity confirmed:  Verbally with patient  Pre-procedure:     Premeds:  Ibuprofen    Negative urine pregnancy test: yes      Prepped with: acetic acid    Indication:     Indication:  ASC-US (HPV 18 positive, other positive)  Procedure:     Procedure: Colposcopy w/ cervical biopsy and ECC      Under satisfactory analgesia the patient was prepped and draped in the dorsal lithotomy position: yes      [place] speculum was placed in the vagina: yes      Under colposcopic examination the transition zone was seen in entirety: yes      Endocervix was curetted using a Kevorkian curette: yes      Cervical biopsy performed with a cervical biopsy punch: yes (12 and 6 oclock)      Specimen(s) to pathology: yes    Post-procedure:     Findings: White epithelium      Findings comment:  6 oclock    Impression: Low grade cervical dysplasia      Patient tolerance of procedure:  Tolerated well, no immediate complications  Comments:      Silver nitrate used for hemostasis. Bleeding and infectious precautions.     Patient endorses continued vaginal itching. Has been on difulcan and vaginal terconazole multiple times throughout the last few months which initially provides relief but returns right away. We discussed diflucan regimen for recurrent yeast infections. Patient would like to try. Diflucan sent to pharmacy, instructions on use provided. Will follow up once back from [place] where she spends the  summers to ensure symptoms are improving.

## 2025-06-24 PROCEDURE — 88305 TISSUE EXAM BY PATHOLOGIST: CPT | Performed by: PATHOLOGY

## 2025-07-07 ENCOUNTER — CLINICAL SUPPORT (OUTPATIENT)
Dept: OBGYN CLINIC | Facility: CLINIC | Age: 35
End: 2025-07-07
Payer: COMMERCIAL

## 2025-07-07 DIAGNOSIS — Z23 NEED FOR HPV VACCINATION: Primary | ICD-10-CM

## 2025-07-07 PROCEDURE — 90651 9VHPV VACCINE 2/3 DOSE IM: CPT

## 2025-07-07 PROCEDURE — 90471 IMMUNIZATION ADMIN: CPT

## 2025-07-07 NOTE — PROGRESS NOTES
Patient here for second Gardasil vaccine.     First dose given 5/16/25. LD    Second does given today in LD per pt request.     Scheduling 3rd dose prior to leaving today. Going on vacation until September.    Lot# [id number]  EXP 02/10/2027